# Patient Record
Sex: FEMALE | Race: WHITE | NOT HISPANIC OR LATINO
[De-identification: names, ages, dates, MRNs, and addresses within clinical notes are randomized per-mention and may not be internally consistent; named-entity substitution may affect disease eponyms.]

---

## 2019-08-02 ENCOUNTER — APPOINTMENT (OUTPATIENT)
Dept: ORTHOPEDIC SURGERY | Facility: CLINIC | Age: 39
End: 2019-08-02
Payer: COMMERCIAL

## 2019-08-02 VITALS
BODY MASS INDEX: 36.48 KG/M2 | HEIGHT: 66 IN | DIASTOLIC BLOOD PRESSURE: 68 MMHG | WEIGHT: 227 LBS | HEART RATE: 66 BPM | SYSTOLIC BLOOD PRESSURE: 97 MMHG

## 2019-08-02 DIAGNOSIS — Z78.9 OTHER SPECIFIED HEALTH STATUS: ICD-10-CM

## 2019-08-02 DIAGNOSIS — Z72.89 OTHER PROBLEMS RELATED TO LIFESTYLE: ICD-10-CM

## 2019-08-02 PROCEDURE — 72170 X-RAY EXAM OF PELVIS: CPT

## 2019-08-02 PROCEDURE — 99214 OFFICE O/P EST MOD 30 MIN: CPT

## 2019-08-02 PROCEDURE — 73562 X-RAY EXAM OF KNEE 3: CPT | Mod: 50

## 2019-08-05 PROBLEM — Z72.89 ALCOHOL USE: Status: ACTIVE | Noted: 2019-08-02

## 2019-08-05 PROBLEM — Z78.9 DOES NOT USE ILLICIT DRUGS: Status: ACTIVE | Noted: 2019-08-02

## 2019-08-05 NOTE — DISCUSSION/SUMMARY
[de-identified] : bilateral knee DJD, lateral osteophytes s/p right knee surgery\par The natural history and treatment of degenerative arthritis was discussed with the patient at length today. The spectrum of treatment including nonoperative modalities to surgical intervention was elucidated. Noninvasive and nonoperative treatment modalities include weight reduction, activity modification with low impact exercise,  as needed use of acetaminophen or anti-inflammatory medications if tolerated, glucosamine/chondroitin supplements, and physical therapy. Further treatments can include corticosteroid injection and the use of viscosupplementation with hyaluronic acid injections. Definitive surgical treatment can certainly include total joint arthroplasty also. The risks and benefits of each treatment options was discussed and all questions were answered.\par The patient was informed of the findings and recommended conservative management in the form of a home exercise program, activity modifications, stationary bicycling, swimming and weight loss program. A trial of Glucosamine and Chondroiten Sulphate was recommended.\par A prescription for a course of physical therapy was provided.\par A prescription for non-steroidal anti-inflammatory medication -diclofenac was provided and the risks, benefits and side effects were discussed.\par Follow-up appointment was recommended in 3-6 months.\par If symptoms progress and worsen, we will consider injection therapy.  She has been told that down the line she may need a total knee replacement, but is young at this time. \par

## 2019-08-05 NOTE — HISTORY OF PRESENT ILLNESS
[de-identified] : Ms. FRANKLYN PETERSEN is a 39 year old female presents with left knee pain for about 2 months.  She notes she has not had a specific injury, and since onset it was resolving slightly, but has recently gotten more painful over the last couple week. She notes her left knee has been slightly swollen as well, and is constantly tender.  \par She was last seen in 2016 for her right knee, which has not changed much since she was last seen.  She notes she has mild constant pain to the right knee as well. \par She is taking Aleve with mild relief of symptoms. \par She is status post osteotomy on the right knee many years ago as well as has a history of right knee meniscus tear. \par At this time, she is having left knee worse than right knee, localized to the lateral and posterior aspect of the left knee. The pain is described as achy, with sharp pain when walking. \par  [Worsening] : worsening [8] : a current pain level of 8/10

## 2019-08-05 NOTE — PHYSICAL EXAM
[de-identified] : On general examination the patient is adequately groomed and nourished. The vital parameters are as recorded. \par There is no lymphedema or diffuse swelling, no varicose veins, no skin warmth/erythema/scars/swelling, no ulcers and no palpable lymph nodes or masses in both lower extremities. Bilateral pedal pulses are well palpable.\par Upper Extremity:\par Both right and left upper extremities are unremarkable in terms of skin rash, lesions, pigmentation, redness, tenderness, swelling, joint instability, abnormal deformity or crepitus. The overall range of motion, sensation, motor tone and strength testing are normal.\par \par Knee Exam\par The gait is left stiff knee antalgic.\par Knee alignment:   slight valgus bilaterally\par The right knee demonstrates well healed scar from prior surgery, the left knee demonstrates no scars and the skin has no warmth, erythema, swelling or tenderness. \par Both knees have a range of motion of\par Extension:                    Right 0 degrees             Left -5 degrees\par Flexion:                                   Right 115 degrees          Left 125 degrees\par Left Knee: There is lateral posterior joint line tenderness. There is mild effusion. There is some mild tenderness medially on the right knee. \par Janene's test is positive. Celeste test is positive.\par Lachman's test, Anterior/Posterior Drawer test and Pivot Shift Tests are negative. \par There is no mediolateral laxity and no anteroposterior instability. \par Patella compression test is negative and patellofemoral tracking is normal with no lateral subluxation, apprehension or instability. \par Right knee quadriceps and hamstrings power is 5. Right Knee Exam is normal.\par Left knee quadriceps and hamstrings power is 4+.\par \par Hip Exam:\par The gait and station is normal\par The patient has equal leg lengths and no pelvic tilt. Irvin/Carmencita test is 7 inches on the right and 7 inches on the left. Active SLR is 60 degrees on the right and 60 degrees on the left. Both hips demonstrate no scars and the skin has no signs of inflammation or tenderness. \par Both Hips have a normal range of motion of flexion to 100 degrees, abduction 40 degrees, adduction 20 degrees, external rotation 40 degrees, internal rotation 20 degrees with symmetrical motion in flexion and extension. There is no flexion contracture, deformity or instability. Labral impingement tests are negative.\par Both hips flexor, abductor and extensor power is normal.\par  [de-identified] : The following radiographs were ordered and read by me during this patients visit. I reviewed each radiograph in detail with the patient and discussed the findings as highlighted below. \par AP, lateral and skyline views of the bilateral knees confirm mild early joint line narrowing, prior surgery right tibia, two pins insitu. There are bone spurs along the lateral joint lines bilateral knees. \par AP view of the pelvis are within normal limits\par

## 2019-08-09 ENCOUNTER — TRANSCRIPTION ENCOUNTER (OUTPATIENT)
Age: 39
End: 2019-08-09

## 2019-10-09 ENCOUNTER — RX RENEWAL (OUTPATIENT)
Age: 39
End: 2019-10-09

## 2019-10-09 RX ORDER — DICLOFENAC SODIUM 75 MG/1
75 TABLET, DELAYED RELEASE ORAL
Qty: 60 | Refills: 0 | Status: ACTIVE | COMMUNITY
Start: 2019-08-02 | End: 1900-01-01

## 2019-10-24 ENCOUNTER — APPOINTMENT (OUTPATIENT)
Dept: ORTHOPEDIC SURGERY | Facility: CLINIC | Age: 39
End: 2019-10-24
Payer: COMMERCIAL

## 2019-10-24 VITALS
HEART RATE: 60 BPM | WEIGHT: 219 LBS | SYSTOLIC BLOOD PRESSURE: 131 MMHG | DIASTOLIC BLOOD PRESSURE: 74 MMHG | HEIGHT: 66 IN | BODY MASS INDEX: 35.2 KG/M2

## 2019-10-24 DIAGNOSIS — M17.11 UNILATERAL PRIMARY OSTEOARTHRITIS, RIGHT KNEE: ICD-10-CM

## 2019-10-24 PROCEDURE — 99214 OFFICE O/P EST MOD 30 MIN: CPT

## 2019-10-24 RX ORDER — DICLOFENAC SODIUM 10 MG/G
1 GEL TOPICAL DAILY
Qty: 1 | Refills: 2 | Status: ACTIVE | COMMUNITY
Start: 2019-10-24 | End: 1900-01-01

## 2019-10-24 RX ORDER — HYALURONATE SODIUM 20 MG/2 ML
20 SYRINGE (ML) INTRAARTICULAR
Qty: 1 | Refills: 0 | Status: ACTIVE | COMMUNITY
Start: 2019-10-24

## 2019-10-24 RX ORDER — DICLOFENAC SODIUM 75 MG/1
75 TABLET, DELAYED RELEASE ORAL
Qty: 1 | Refills: 1 | Status: ACTIVE | COMMUNITY
Start: 2019-10-24 | End: 1900-01-01

## 2019-10-24 NOTE — PHYSICAL EXAM
[Antalgic] : antalgic [de-identified] : The following radiographs taken last visit, and reviewed again by me during this patients visit. I reviewed each radiograph in detail with the patient and discussed the findings as highlighted below. \par AP, lateral and skyline views of the bilateral knees confirm mild early joint line narrowing, prior surgery right tibia, two pins insitu. There are bone spurs along the lateral joint lines bilateral knees. There is advanced degenerative joint disease patellofemoral joints bilaterally with lateral subluxation and osteophyte formation. \par AP view of the pelvis are within normal limits\par  [de-identified] : On general examination the patient is adequately groomed and nourished. The vital parameters are as recorded. \par There is no lymphedema or diffuse swelling, no varicose veins, no skin warmth/erythema/scars/swelling, no ulcers and no palpable lymph nodes or masses in both lower extremities. Bilateral pedal pulses are well palpable.\par Upper Extremity:\par Both right and left upper extremities are unremarkable in terms of skin rash, lesions, pigmentation, redness, tenderness, swelling, joint instability, abnormal deformity or crepitus. The overall range of motion, sensation, motor tone and strength testing are normal.\par \par Knee Exam\par The gait is left stiff knee antalgic.\par Knee alignment:   slight valgus bilaterally\par The right knee demonstrates well healed scar from prior surgery, the left knee demonstrates no scars and the skin has no warmth, erythema, swelling or tenderness. \par Both knees have a range of motion of\par Extension:                    Right 0 degrees             Left -5 degrees\par Flexion:                                   Right 115 degrees          Left 125 degrees\par Left Knee: There is medial and lateral posterior joint line tenderness. There is patellofemoral joint line tenderness. There is mild effusion. There is some mild tenderness medially on the right knee. \par Janene's test is positive. Celeste test is positive.\par Lachman's test, Anterior/Posterior Drawer test and Pivot Shift Tests are negative. \par There is no mediolateral laxity and no anteroposterior instability. \par Patella compression test is negative and patellofemoral tracking is normal with no lateral subluxation, apprehension or instability. \par Right knee quadriceps and hamstrings power is 4+\par Left knee quadriceps and hamstrings power is 4+.\par \par Hip Exam:\par The gait and station is normal\par The patient has equal leg lengths and no pelvic tilt. Irvin/Carmencita test is 7 inches on the right and 7 inches on the left. Active SLR is 60 degrees on the right and 60 degrees on the left. Both hips demonstrate no scars and the skin has no signs of inflammation or tenderness. \par Both Hips have a normal range of motion of flexion to 100 degrees, abduction 40 degrees, adduction 20 degrees, external rotation 40 degrees, internal rotation 20 degrees with symmetrical motion in flexion and extension. There is no flexion contracture, deformity or instability. Labral impingement tests are negative.\par Both hips flexor, abductor and extensor power is normal.\par \par Neurology:\par The patient is alert and oriented in person, place and time. The mood is calm and affect is normal.\par Testing for coordination including Rhomberg's Test and Finger-Nose Test, sensation, motor tone and power and deep tendon reflexes in both lower extremities is normal.\par

## 2019-10-24 NOTE — DISCUSSION/SUMMARY
[de-identified] : bilateral knee DJD, most prominent patellofemoral joint,  s/p right knee osteotomy. \par The natural history and treatment of degenerative arthritis was discussed with the patient at length today. The spectrum of treatment including nonoperative modalities to surgical intervention was elucidated. Noninvasive and nonoperative treatment modalities include weight reduction, activity modification with low impact exercise,  as needed use of acetaminophen or anti-inflammatory medications if tolerated, glucosamine/chondroitin supplements, and physical therapy. Further treatments can include corticosteroid injection and the use of viscosupplementation with hyaluronic acid injections. Definitive surgical treatment can certainly include total joint arthroplasty also. The risks and benefits of each treatment options was discussed and all questions were answered.\par The patient was informed of the findings and recommended conservative management in the form of a home exercise program, activity modifications, stationary bicycling, swimming and weight loss program. A trial of Glucosamine and Chondroiten Sulphate was recommended.\par A prescription for a course of physical therapy was provided for her to continue. \par A prescription for non-steroidal anti-inflammatory medication -diclofenac was provided and the risks, benefits and side effects were discussed.\par I have recommended Euflexxa injections to the left knee and the patient agreed to proceed, and the risks, benefits and side effects were discussed.Follow-up appointment was recommended once the injection is received in the office to begin the series\par

## 2019-10-24 NOTE — HISTORY OF PRESENT ILLNESS
[Worsening] : worsening [8] : a current pain level of 8/10 [Constant] : ~He/She~ states the symptoms seem to be constant [de-identified] : Ms. FRANKLYN PETERSEN is a 39 year old female presents for a follow up for her bilateral knees. She continues to have worsening left knee pain. She has been doing physical therapy, with no lasting relief. She has also been taking diclofenac, with no relief of inflammation. She notes she is still feeling pulling in the anterior medial knee, with inflammation worse on the left knee. She states since onset of her pain, she has had flair ups. \par She notes her left knee has been slightly swollen as well, and is constantly tender.  \par She was last seen in 2016 for her right knee, which has not changed much since she was last seen.  She notes she has mild constant pain to the right knee as well, which remains manageable. \par \par She is status post osteotomy on the right knee many years ago as well as has a history of right knee meniscus tear. \par At this time, she is having left knee worse than right knee, localized to the anterior and posterior aspect of the left knee. The pain is described as achy, with sharp pain when walking. \par \par Denies chronic medical conditions. Surgical history below. She denies family history of any musculoskeletal conditions.

## 2019-11-14 ENCOUNTER — APPOINTMENT (OUTPATIENT)
Dept: ORTHOPEDIC SURGERY | Facility: CLINIC | Age: 39
End: 2019-11-14
Payer: COMMERCIAL

## 2019-11-14 PROCEDURE — 20610 DRAIN/INJ JOINT/BURSA W/O US: CPT | Mod: LT

## 2019-11-14 NOTE — PROCEDURE
[Left] : of the left [Injection] : Injection [Patient] : patient [Knee Joint] : knee joint [Osteoarthritis] : Osteoarthritis [Benefits] : benefits [Risk] : risk [Alternatives] : alternatives [Infection] : infection [Allergic Reaction] : allergic reaction [Bleeding] : bleeding [Ethyl Chloride Spray] : ethyl chloride spray was used as a topical anesthetic [Betadine] : Betadine [Verbal Consent Obtained] : verbal consent was obtained prior to the procedure [Lateral] : lateral [Anterior] : anterior [22] : a 22-gauge [None] : none [Bandage Applied] : a bandage [Tolerated Well] : The patient tolerated the procedure well [___ Week(s)] : in [unfilled] week(s) [2 mL Euflexxa___(lot #)] : 2mL Euflexxa ~Ulot# [unfilled] [de-identified] : The patient received the first Euflexxa injection to the left knee and tolerated well. \par The patient has been instructed to ice and elevate to alleviate/reduce swelling. \par follow up appointment recommended next week for the second injection to the left knee.\par \par TMV

## 2019-11-21 ENCOUNTER — APPOINTMENT (OUTPATIENT)
Dept: ORTHOPEDIC SURGERY | Facility: CLINIC | Age: 39
End: 2019-11-21
Payer: COMMERCIAL

## 2019-11-21 PROCEDURE — 20610 DRAIN/INJ JOINT/BURSA W/O US: CPT | Mod: LT

## 2019-11-21 NOTE — PROCEDURE
[Injection] : Injection [Left] : of the left [Knee Joint] : knee joint [Osteoarthritis] : Osteoarthritis [Patient] : patient [Risk] : risk [Alternatives] : alternatives [Benefits] : benefits [Bleeding] : bleeding [Infection] : infection [Allergic Reaction] : allergic reaction [Verbal Consent Obtained] : verbal consent was obtained prior to the procedure [Ethyl Chloride Spray] : ethyl chloride spray was used as a topical anesthetic [Betadine] : Betadine [Lateral] : lateral [Anterior] : anterior [22] : a 22-gauge [2 mL Euflexxa___(lot #)] : 2mL Euflexxa ~Ulot# [unfilled] [Bandage Applied] : a bandage [Tolerated Well] : The patient tolerated the procedure well [None] : none [___ Week(s)] : in [unfilled] week(s) [de-identified] : The patient received the second Euflexxa injection to the left knee and tolerated well. \par The patient has been instructed to ice and elevate to alleviate/reduce swelling. \par follow up appointment recommended next week for the third injection to the left knee.\par \par

## 2019-12-04 ENCOUNTER — APPOINTMENT (OUTPATIENT)
Dept: ORTHOPEDIC SURGERY | Facility: CLINIC | Age: 39
End: 2019-12-04
Payer: COMMERCIAL

## 2019-12-04 PROCEDURE — 20610 DRAIN/INJ JOINT/BURSA W/O US: CPT | Mod: LT

## 2019-12-04 RX ORDER — DICLOFENAC SODIUM 10 MG/G
1 GEL TOPICAL
Qty: 1 | Refills: 0 | Status: ACTIVE | COMMUNITY
Start: 2019-12-04 | End: 1900-01-01

## 2019-12-04 NOTE — PROCEDURE
[Injection] : Injection [Knee Joint] : knee joint [Left] : of the left [Patient] : patient [Osteoarthritis] : Osteoarthritis [Risk] : risk [Alternatives] : alternatives [Benefits] : benefits [Bleeding] : bleeding [Infection] : infection [Verbal Consent Obtained] : verbal consent was obtained prior to the procedure [Allergic Reaction] : allergic reaction [Betadine] : Betadine [Alcohol] : Alcohol [Ethyl Chloride Spray] : ethyl chloride spray was used as a topical anesthetic [Lateral] : lateral [22] : a 22-gauge [Anterior] : anterior [Bandage Applied] : a bandage [2 mL Euflexxa___(lot #)] : 2mL Euflexxa ~Ulot# [unfilled] [Tolerated Well] : The patient tolerated the procedure well [None] : none [___ Week(s)] : in [unfilled] week(s) [de-identified] : The patient received the third Euflexxa injection to the left knee and tolerated well. \par The patient has been instructed to ice and elevate to alleviate/reduce swelling. \par follow up appointment recommended in 6 months

## 2020-06-04 ENCOUNTER — APPOINTMENT (OUTPATIENT)
Dept: ORTHOPEDIC SURGERY | Facility: CLINIC | Age: 40
End: 2020-06-04
Payer: COMMERCIAL

## 2020-06-04 VITALS — DIASTOLIC BLOOD PRESSURE: 79 MMHG | SYSTOLIC BLOOD PRESSURE: 135 MMHG | HEART RATE: 84 BPM

## 2020-06-04 VITALS — TEMPERATURE: 96.3 F

## 2020-06-04 DIAGNOSIS — M17.31 UNILATERAL POST-TRAUMATIC OSTEOARTHRITIS, RIGHT KNEE: ICD-10-CM

## 2020-06-04 DIAGNOSIS — M17.12 UNILATERAL PRIMARY OSTEOARTHRITIS, LEFT KNEE: ICD-10-CM

## 2020-06-04 DIAGNOSIS — M23.91 UNSPECIFIED INTERNAL DERANGEMENT OF RIGHT KNEE: ICD-10-CM

## 2020-06-04 PROCEDURE — 99213 OFFICE O/P EST LOW 20 MIN: CPT

## 2020-06-10 PROBLEM — M17.12 PRIMARY LOCALIZED OSTEOARTHRITIS OF LEFT KNEE: Status: ACTIVE | Noted: 2019-08-02

## 2020-06-10 RX ORDER — DICLOFENAC SODIUM 75 MG/1
75 TABLET, DELAYED RELEASE ORAL
Qty: 1 | Refills: 1 | Status: ACTIVE | COMMUNITY
Start: 2020-06-04

## 2020-06-10 NOTE — PHYSICAL EXAM
[Antalgic] : antalgic [de-identified] : On general examination the patient is adequately groomed and nourished. The vital parameters are as recorded. \par There is no lymphedema or diffuse swelling, no varicose veins, no skin warmth/erythema/scars/swelling, no ulcers and no palpable lymph nodes or masses in both lower extremities. Bilateral pedal pulses are well palpable.\par Upper Extremity:\par Both right and left upper extremities are unremarkable in terms of skin rash, lesions, pigmentation, redness, tenderness, swelling, joint instability, abnormal deformity or crepitus. The overall range of motion, sensation, motor tone and strength testing are normal.\par \par Knee Exam\par The gait is left stiff knee antalgic.\par Knee alignment:   slight valgus bilaterally\par The right knee demonstrates well healed scar from prior surgery, the left knee demonstrates no scars and the skin has no warmth, erythema, swelling or tenderness. \par Both knees have a range of motion of\par Extension:                    Right 0 degrees             Left -5 degrees\par Flexion:                                   Right 115 degrees          Left 125 degrees\par Left Knee: There is medial and lateral posterior joint line tenderness. There is patellofemoral joint line tenderness. There is mild effusion. There is some mild tenderness medially on the right knee. \par Janene's test is positive. Celeste test is positive.\par Lachman's test, Anterior/Posterior Drawer test and Pivot Shift Tests are negative. \par There is no mediolateral laxity and no anteroposterior instability. \par Patella compression test is negative and patellofemoral tracking is normal with no lateral subluxation, apprehension or instability. \par Right knee quadriceps and hamstrings power is 4+\par Left knee quadriceps and hamstrings power is 4+.\par \par Hip Exam:\par The gait and station is normal\par The patient has equal leg lengths and no pelvic tilt. Irvin/Carmencita test is 7 inches on the right and 7 inches on the left. Active SLR is 60 degrees on the right and 60 degrees on the left. Both hips demonstrate no scars and the skin has no signs of inflammation or tenderness. \par Both Hips have a normal range of motion of flexion to 100 degrees, abduction 40 degrees, adduction 20 degrees, external rotation 40 degrees, internal rotation 20 degrees with symmetrical motion in flexion and extension. There is no flexion contracture, deformity or instability. Labral impingement tests are negative.\par Both hips flexor, abductor and extensor power is normal.\par \par Neurology:\par The patient is alert and oriented in person, place and time. The mood is calm and affect is normal.\par Testing for coordination including Rhomberg's Test and Finger-Nose Test, sensation, motor tone and power and deep tendon reflexes in both lower extremities is normal.\par  [de-identified] : The following radiographs taken last visit, and reviewed again by me during this patients visit. I reviewed each radiograph in detail with the patient and discussed the findings as highlighted below. \par AP, lateral and skyline views of the bilateral knees confirm mild early joint line narrowing, prior surgery right tibia, two pins insitu. There are bone spurs along the lateral joint lines bilateral knees. There is advanced degenerative joint disease patellofemoral joints bilaterally with lateral subluxation and osteophyte formation. \par AP view of the pelvis are within normal limits\par

## 2020-06-10 NOTE — HISTORY OF PRESENT ILLNESS
[8] : a current pain level of 8/10 [Constant] : ~He/She~ states the symptoms seem to be constant [Stable] : stable [de-identified] : Ms. FRANKLYN PETERSEN is a 39 year old female presents for a follow up for her bilateral knees. She continues to have left more than right knee pain. Since she was last seen, she notes she has had continued relief from the Euflexxa injections. She has also been taking diclofenac, with minimal relief of inflammation. She notes her pain is more manageable, and she is able to participate in more activities. She notes her pain is more dull and achy to bilateral knees at this time following the injections with less flair ups. \par She is status post osteotomy on the right knee many years ago as well as has a history of right knee meniscus tear. \par Denies chronic medical conditions. Surgical history below. She denies family history of any musculoskeletal conditions.

## 2020-06-10 NOTE — DISCUSSION/SUMMARY
[de-identified] : bilateral knee DJD, most prominent patellofemoral joint,  s/p right knee osteotomy. \par The natural history and treatment of degenerative arthritis was discussed with the patient at length today. The spectrum of treatment including nonoperative modalities to surgical intervention was elucidated. Noninvasive and nonoperative treatment modalities include weight reduction, activity modification with low impact exercise,  as needed use of acetaminophen or anti-inflammatory medications if tolerated, glucosamine/chondroitin supplements, and physical therapy. Further treatments can include corticosteroid injection and the use of viscosupplementation with hyaluronic acid injections. Definitive surgical treatment can certainly include total joint arthroplasty also. We discussed that she is a young patient, and the longer we wait for total knee replacement the better due to the lifespan of a joint replacement being shorter than her projected life span. The risks and benefits of each treatment options was discussed and all questions were answered.\par The patient was informed of the findings and recommended conservative management in the form of a home exercise program, activity modifications, stationary bicycling, swimming and weight loss program. A trial of Glucosamine and Chondroiten Sulphate was recommended.\par A prescription for a course of physical therapy was provided for her to continue. \par A prescription for non-steroidal anti-inflammatory medication -diclofenac was provided and the risks, benefits and side effects were discussed.\par At this time we will continue with the above, and if her pain worsens we will consider the injections at that time. \par She will continue routine follow up for evaluation of pain and progression of her arthritis. \par I have recommended a three to six month follow up, or sooner if symptoms worsen\par

## 2020-10-27 ENCOUNTER — TRANSCRIPTION ENCOUNTER (OUTPATIENT)
Age: 40
End: 2020-10-27

## 2021-07-15 ENCOUNTER — TRANSCRIPTION ENCOUNTER (OUTPATIENT)
Age: 41
End: 2021-07-15

## 2022-02-17 ENCOUNTER — EMERGENCY (EMERGENCY)
Facility: HOSPITAL | Age: 42
LOS: 1 days | Discharge: HOME | End: 2022-02-17
Attending: STUDENT IN AN ORGANIZED HEALTH CARE EDUCATION/TRAINING PROGRAM | Admitting: STUDENT IN AN ORGANIZED HEALTH CARE EDUCATION/TRAINING PROGRAM
Payer: COMMERCIAL

## 2022-02-17 VITALS
HEIGHT: 66 IN | OXYGEN SATURATION: 100 % | DIASTOLIC BLOOD PRESSURE: 61 MMHG | HEART RATE: 78 BPM | SYSTOLIC BLOOD PRESSURE: 136 MMHG | RESPIRATION RATE: 20 BRPM | TEMPERATURE: 98 F

## 2022-02-17 DIAGNOSIS — Z98.89 OTHER SPECIFIED POSTPROCEDURAL STATES: Chronic | ICD-10-CM

## 2022-02-17 LAB
ALBUMIN SERPL ELPH-MCNC: 3.9 G/DL — SIGNIFICANT CHANGE UP (ref 3.3–5)
ALP SERPL-CCNC: 70 U/L — SIGNIFICANT CHANGE UP (ref 40–120)
ALT FLD-CCNC: 14 U/L — SIGNIFICANT CHANGE UP (ref 4–33)
ANION GAP SERPL CALC-SCNC: 12 MMOL/L — SIGNIFICANT CHANGE UP (ref 7–14)
AST SERPL-CCNC: 19 U/L — SIGNIFICANT CHANGE UP (ref 4–32)
BASOPHILS # BLD AUTO: 0.06 K/UL — SIGNIFICANT CHANGE UP (ref 0–0.2)
BASOPHILS NFR BLD AUTO: 0.7 % — SIGNIFICANT CHANGE UP (ref 0–2)
BILIRUB SERPL-MCNC: 0.3 MG/DL — SIGNIFICANT CHANGE UP (ref 0.2–1.2)
BUN SERPL-MCNC: 12 MG/DL — SIGNIFICANT CHANGE UP (ref 7–23)
CALCIUM SERPL-MCNC: 9 MG/DL — SIGNIFICANT CHANGE UP (ref 8.4–10.5)
CHLORIDE SERPL-SCNC: 103 MMOL/L — SIGNIFICANT CHANGE UP (ref 98–107)
CO2 SERPL-SCNC: 20 MMOL/L — LOW (ref 22–31)
CREAT SERPL-MCNC: 1.04 MG/DL — SIGNIFICANT CHANGE UP (ref 0.5–1.3)
EOSINOPHIL # BLD AUTO: 0.14 K/UL — SIGNIFICANT CHANGE UP (ref 0–0.5)
EOSINOPHIL NFR BLD AUTO: 1.6 % — SIGNIFICANT CHANGE UP (ref 0–6)
GLUCOSE SERPL-MCNC: 93 MG/DL — SIGNIFICANT CHANGE UP (ref 70–99)
HCT VFR BLD CALC: 39.1 % — SIGNIFICANT CHANGE UP (ref 34.5–45)
HGB BLD-MCNC: 12.9 G/DL — SIGNIFICANT CHANGE UP (ref 11.5–15.5)
IANC: 5.64 K/UL — SIGNIFICANT CHANGE UP (ref 1.5–8.5)
IMM GRANULOCYTES NFR BLD AUTO: 0.7 % — SIGNIFICANT CHANGE UP (ref 0–1.5)
LYMPHOCYTES # BLD AUTO: 2.39 K/UL — SIGNIFICANT CHANGE UP (ref 1–3.3)
LYMPHOCYTES # BLD AUTO: 27.1 % — SIGNIFICANT CHANGE UP (ref 13–44)
MCHC RBC-ENTMCNC: 28.8 PG — SIGNIFICANT CHANGE UP (ref 27–34)
MCHC RBC-ENTMCNC: 33 GM/DL — SIGNIFICANT CHANGE UP (ref 32–36)
MCV RBC AUTO: 87.3 FL — SIGNIFICANT CHANGE UP (ref 80–100)
MONOCYTES # BLD AUTO: 0.52 K/UL — SIGNIFICANT CHANGE UP (ref 0–0.9)
MONOCYTES NFR BLD AUTO: 5.9 % — SIGNIFICANT CHANGE UP (ref 2–14)
NEUTROPHILS # BLD AUTO: 5.64 K/UL — SIGNIFICANT CHANGE UP (ref 1.8–7.4)
NEUTROPHILS NFR BLD AUTO: 64 % — SIGNIFICANT CHANGE UP (ref 43–77)
NRBC # BLD: 0 /100 WBCS — SIGNIFICANT CHANGE UP
NRBC # FLD: 0 K/UL — SIGNIFICANT CHANGE UP
PLATELET # BLD AUTO: 208 K/UL — SIGNIFICANT CHANGE UP (ref 150–400)
POTASSIUM SERPL-MCNC: 4.6 MMOL/L — SIGNIFICANT CHANGE UP (ref 3.5–5.3)
POTASSIUM SERPL-SCNC: 4.6 MMOL/L — SIGNIFICANT CHANGE UP (ref 3.5–5.3)
PROT SERPL-MCNC: 6.6 G/DL — SIGNIFICANT CHANGE UP (ref 6–8.3)
RBC # BLD: 4.48 M/UL — SIGNIFICANT CHANGE UP (ref 3.8–5.2)
RBC # FLD: 13.1 % — SIGNIFICANT CHANGE UP (ref 10.3–14.5)
SODIUM SERPL-SCNC: 135 MMOL/L — SIGNIFICANT CHANGE UP (ref 135–145)
TROPONIN T, HIGH SENSITIVITY RESULT: <6 NG/L — SIGNIFICANT CHANGE UP
WBC # BLD: 8.81 K/UL — SIGNIFICANT CHANGE UP (ref 3.8–10.5)
WBC # FLD AUTO: 8.81 K/UL — SIGNIFICANT CHANGE UP (ref 3.8–10.5)

## 2022-02-17 PROCEDURE — 99285 EMERGENCY DEPT VISIT HI MDM: CPT

## 2022-02-17 PROCEDURE — 71046 X-RAY EXAM CHEST 2 VIEWS: CPT | Mod: 26

## 2022-02-17 RX ORDER — ASPIRIN/CALCIUM CARB/MAGNESIUM 324 MG
162 TABLET ORAL ONCE
Refills: 0 | Status: COMPLETED | OUTPATIENT
Start: 2022-02-17 | End: 2022-02-17

## 2022-02-17 RX ADMIN — Medication 162 MILLIGRAM(S): at 20:38

## 2022-02-17 NOTE — ED ADULT NURSE NOTE - OBJECTIVE STATEMENT
42 yo F AAOx4 received to intake 8 c/o L sided chest discomfort x 1 day, denies parasthesias, #20 placed to LAC, pending CXR, NAD on exam

## 2022-02-17 NOTE — ED PROVIDER NOTE - ATTENDING CONTRIBUTION TO CARE
I have personally performed a face to face medical and diagnostic evaluation of the patient. I have discussed with and reviewed the ACP's note and agree with the History, ROS, Physical Exam and MDM unless otherwise indicated. A brief summary of my personal evaluation and impression can be found below.    42yo F s/p cholecystectomy p/w 24 hours of L sided cp that waxes/wanes and feels like burning/pinching. No exertional/pleeuritic sx. Not on OCP. No SOB, LE pain/swelling. No hx of similar episodes or family hx of early onset CAD. Upon my eval, sx are significantly improved at this time  VITALS: Initial triage and subsequent vitals have been reviewed by me.  Gen: Well appearing, NAD, alert, non-toxic  Head: NCAT  HEENT: MMM, normal conjunctiva, anicteric, neck supple  Lung: CTAB, no adventitious sounds  CV: RRR, no murmurs, 2+symmetric peripheral pulses  Abd: soft, NTND, no rebound or guarding, no palpable masses  MSK: No edema, no visible deformities  Neuro: Moving all extremity grossly, following commands appropriately, fluid speech  Skin: Warm and dry, no evidence of rash  Psych: normal mood and affect   Low suspicion ACS, low HEART score. Will get cardiac w/u and dispo. PERC neg.

## 2022-02-17 NOTE — ED PROVIDER NOTE - CARE PROVIDER_API CALL
Son Mace (DO)  Cardiovascular Disease; Internal Medicine; Nuclear Cardiology  [address] [place] [address], [place] [address]  Phone: [phone]  Fax: [phone]  Follow Up Time:

## 2022-02-17 NOTE — ED PROVIDER NOTE - OBJECTIVE STATEMENT
40 Y/O F denies PMH PSH Cholecystectomy, H/O prior tibia surgery presents with 24 hours of constant L sided CP that will raidate a 42 Y/O F denies PMH PSH Cholecystectomy, H/O prior tibia surgery presents with 24 hours of constant L sided CP that will radiate across the chest which feels like a burning/pinching. Pt states there are no provoking factors. Pt denies SOB, recent travel or LE edema, denies OCP use and denies a change in pain with breathing or turning. Pt denies H/O smoking and denies a FH of heart issues or sudden death. Pt denies any other sx or acute complaints.

## 2022-02-17 NOTE — ED PROVIDER NOTE - CLINICAL SUMMARY MEDICAL DECISION MAKING FREE TEXT BOX
42 Y/O F denies PMH PSH Cholecystectomy, H/O prior tibia surgery presents with 24 hours of constant L sided CP that will radiate across the chest which feels like a burning/pinching. Pt states there are no provoking factors. Pt denies SOB, recent travel or LE edema, denies OCP use and denies a change in pain with breathing or turning. EKG is normal (NO STEMI) plan is labs to eval for anemia or electrolyte disturbance and troponin to eval for ACS. Pt offered CDU placement for an AM stress test which she is currently considering.

## 2022-02-17 NOTE — ED PROVIDER NOTE - NSICDXPASTSURGICALHX_GEN_ALL_CORE_FT
PAST SURGICAL HISTORY:  H/O right knee surgery     H/O right knee surgery     S/P cholecystectomy

## 2022-02-17 NOTE — ED PROVIDER NOTE - PATIENT PORTAL LINK FT
You can access the FollowMyHealth Patient Portal offered by [facility] by registering at the following website: [email or web address]. By joining [employer]’s FollowMyHealth portal, you will also be able to view your health information using other applications (apps) compatible with our system.

## 2022-02-17 NOTE — ED ADULT TRIAGE NOTE - CHIEF COMPLAINT QUOTE
Pt c/o chest pain x 1 day. States pain is constant, L sided radiating to upper back. Denies SOB, nausea/vomiting, fevers. Reports sore throat. No known sick contacts. Denies PMHx.

## 2022-02-17 NOTE — ED PROVIDER NOTE - NSFOLLOWUPINSTRUCTIONS_ED_ALL_ED_FT
Follow up with a Cardiologist as soon as possible, you can follow up with Dr. Mace, take aspirin 81mg daily. Advance activity as tolerated.  Continue all previously prescribed medications as directed.  Follow up with your primary care physician in 48-72 hours- bring copies of your results.  Return to the ER for worsening or persistent symptoms, and/or ANY NEW OR CONCERNING SYMPTOMS. If you have issues obtaining follow up, please call: [phone] to obtain a doctor or specialist who takes your insurance in your area.  You may call [phone] to make an appointment with the internal medicine clinic.